# Patient Record
Sex: MALE | Race: WHITE | NOT HISPANIC OR LATINO | URBAN - METROPOLITAN AREA
[De-identification: names, ages, dates, MRNs, and addresses within clinical notes are randomized per-mention and may not be internally consistent; named-entity substitution may affect disease eponyms.]

---

## 2022-02-10 ENCOUNTER — TRANSFERRED RECORDS (OUTPATIENT)
Dept: HEALTH INFORMATION MANAGEMENT | Facility: CLINIC | Age: 37
End: 2022-02-10
Payer: COMMERCIAL

## 2022-03-01 ENCOUNTER — MEDICAL CORRESPONDENCE (OUTPATIENT)
Dept: HEALTH INFORMATION MANAGEMENT | Facility: CLINIC | Age: 37
End: 2022-03-01
Payer: COMMERCIAL

## 2022-03-01 ENCOUNTER — TRANSCRIBE ORDERS (OUTPATIENT)
Dept: OTHER | Age: 37
End: 2022-03-01
Payer: COMMERCIAL

## 2022-03-01 ENCOUNTER — TRANSFERRED RECORDS (OUTPATIENT)
Dept: HEALTH INFORMATION MANAGEMENT | Facility: CLINIC | Age: 37
End: 2022-03-01
Payer: COMMERCIAL

## 2022-03-01 DIAGNOSIS — K11.8 OTHER DISEASES OF SALIVARY GLANDS: Primary | ICD-10-CM

## 2022-03-02 NOTE — TELEPHONE ENCOUNTER
FUTURE VISIT INFORMATION      FUTURE VISIT INFORMATION:    Date: 22    Time: 1PM    [place] [facility] [place]:    Referring provider:  Jonnie Munoz MD    Referring providers clinic:  [facility]    Reason for visit/diagnosis  Other diseases of salivary glands, referred by Jonnie Munoz MD in [facility] EXTERNAL DATA DEPARTMENT, recds in [facility] per pt     RECORDS REQUESTED FROM:         Clinic name Comments Records Status Imaging Status   [facility]  3/1/22 ENT note from Jonnie diehl MD Scanned in [facility]   [address], [place] [address]  [place]   Ph. [phone]  [facility] recs fx. [phone]      2/10/22 CT NECK      *trackin Scanned in EPIC req 3/2/22 - received 3/8/22   [facility] SPEECH THERAPY   2010 note from Zulma Granados MA, CCC/SLP   Care everywhere     [facility] EAR, NOSE AND THROAT  12/21/10 note from César Woods MD   Care everywhere                             3/2/22 3:22M called and spoke with [facility], they cannot push images. Sent a fax for imaging disc to be mailed out - Amay  3/3/22 8:25AM received a call from victoria at [facility] radiology, images were done on 2/10/22 not 3/1/22, notified Victoria that the exam date on the report we have is cut off so it looked like 3/1/22, she will mail out a disc today - Amay  3/7/22 8AM disc received from [facility], sent for upload - Amay

## 2022-03-02 NOTE — TELEPHONE ENCOUNTER
FUTURE VISIT INFORMATION - rescheduled       FUTURE VISIT INFORMATION:    Date: 3/7/22    Time: 1:40PM    [place] [facility] [place]  REFERRAL INFORMATION:    Referring provider:  Jonnie Munoz MD    Referring providers clinic:  [facility]    Reason for visit/diagnosis  Other diseases of salivary glands, referred by Jonnie Munoz MD in [facility] EXTERNAL DATA DEPARTMENT, recds in [facility] per pt    RECORDS REQUESTED FROM:       Clinic name Comments Records Status Imaging Status   [facility]  3/1/22 ENT note from Jonnie diehl MD Scanned in [facility]   [address], [place] [address]  [place]   Ph. [phone]  Med recs fx. [phone]     2/10/22 CT NECK     *trackin Scanned in EPIC re 3/2/22   [facility] SPEECH THERAPY   2010 note from Zulma Granados MA, CCC/SLP   Care everywhere    [facility] EAR, NOSE AND THROAT  12/21/10 note from César Woods MD   Care everywhere                   3/2/22 3:22M called and spoke with [facility], they cannot push images. Sent a fax for imaging disc to be mailed out - Amay  3/3/22 8:25AM received a call from victoria at [facility] radiology, images were done on 2/10/22 not 3/1/22, notified Victoria that the exam date on the report we have is cut off so it looked like 3/1/22, she will mail out a disc today - Amay

## 2022-03-07 ENCOUNTER — PRE VISIT (OUTPATIENT)
Dept: OTOLARYNGOLOGY | Facility: CLINIC | Age: 37
End: 2022-03-07

## 2022-03-23 ENCOUNTER — TELEPHONE (OUTPATIENT)
Dept: OTOLARYNGOLOGY | Facility: CLINIC | Age: 37
End: 2022-03-23
Payer: COMMERCIAL

## 2022-03-23 NOTE — TELEPHONE ENCOUNTER
Pt left message on surgery scheduling line regarding his appointment on 4/19/2022 with Dr. Swartz. Per patient, he was told that he was going to receive a packet and information in the mail and he has not and does not know where to go.    This is a clinic appointment. Routed to the clinic coordinators for follow up.

## 2022-04-19 ENCOUNTER — OFFICE VISIT (OUTPATIENT)
Dept: OTOLARYNGOLOGY | Facility: CLINIC | Age: 37
End: 2022-04-19
Attending: OTOLARYNGOLOGY
Payer: COMMERCIAL

## 2022-04-19 ENCOUNTER — PRE VISIT (OUTPATIENT)
Dept: OTOLARYNGOLOGY | Facility: CLINIC | Age: 37
End: 2022-04-19

## 2022-04-19 ENCOUNTER — PREP FOR PROCEDURE (OUTPATIENT)
Dept: OTOLARYNGOLOGY | Facility: CLINIC | Age: 37
End: 2022-04-19

## 2022-04-19 VITALS
TEMPERATURE: 98.1 F | HEIGHT: 75 IN | BODY MASS INDEX: 31.46 KG/M2 | WEIGHT: 253 LBS | DIASTOLIC BLOOD PRESSURE: 78 MMHG | HEART RATE: 99 BPM | SYSTOLIC BLOOD PRESSURE: 138 MMHG

## 2022-04-19 DIAGNOSIS — K11.8 OTHER DISEASES OF SALIVARY GLANDS: ICD-10-CM

## 2022-04-19 DIAGNOSIS — K11.20 SIALADENITIS: Primary | ICD-10-CM

## 2022-04-19 PROCEDURE — 99203 OFFICE O/P NEW LOW 30 MIN: CPT | Performed by: OTOLARYNGOLOGY

## 2022-04-19 RX ORDER — DEXAMETHASONE SODIUM PHOSPHATE 4 MG/ML
10 INJECTION, SOLUTION INTRA-ARTICULAR; INTRALESIONAL; INTRAMUSCULAR; INTRAVENOUS; SOFT TISSUE ONCE
Status: CANCELLED | OUTPATIENT
Start: 2022-04-19 | End: 2022-04-19

## 2022-04-19 ASSESSMENT — PAIN SCALES - GENERAL: PAINLEVEL: NO PAIN (0)

## 2022-04-19 NOTE — LETTER
2022       RE: Baljit Liao  [address] [address]     Dear Colleague,    Thank you for referring your patient, Baljit Liao, to the [facility] [place] at [facility]. Please see a copy of my visit note below.      Otolaryngology Clinic      Name: Baljit Liao  MRN: [id number]  Age: 36 year old  : 1985  Referring provider: Jonnie Munoz  2022      Chief Complaint:  Consultation    History of Present Illness:   Baljit Liao is a 36 year old male with who presents for consultation regarding pre-auricular swelling and discomfort in his mouth while eating.  He tells me that essentially any food he eats promotes increased salivation from his upper posterior salivary glands. This has been ongoing for the last two years and seemed to be right after dental work. He did have an infection just after the first work was done and was treated with antibiotics. He then again was treated with antibiotics a few months ago after an infection that started after dental work again. Both infections did swell to the point where his jaw was impeded from biting down normally. 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     Active Medications:   No current outpatient medications on file.      Allergies:   Patient has no allergy information on record.      Past Medical History:  No past medical history on file.  There is no problem list on file for this patient.       Past Surgical History:  No past surgical history on file.    Family History:   No family history on file.      Social History:        Review of Systems:   Pertinent items are noted in HPI or as in patient entered ROS below, remainder of complete ROS is negative.   No flowsheet data found.      Physical Exam:   There were no vitals taken for this visit.     Constitutional:  The patient was accompanied,  well-groomed, and in no acute distress.    Skin:  Warm and pink.    Neurologic:  Alert and oriented x 3.  CN's III-XII within normal limits.  Voice normal.   Psychiatric:  The patient's affect was calm, cooperative, and appropriate.    Respiratory:  Breathing comfortably without stridor or exertion of accessory muscles.    Eyes: Extraocular movement intact.    Head:  Normocephalic and atraumatic.  No lesions or scars.    Nose:  Sinuses were non-tender.  Anterior rhinoscopy revealed midline septum and absence of purulence or polyps.    OC/OP:  Normal tongue, floor of mouth, buccal mucosa, and palate.  No lesions or masses on inspection or palpation.  No abnormal lymph tissue in the oropharynx.  The pterygoid region is non-tender.    Neck:  Supple with normal laryngeal and tracheal landmarks.  The parotid beds were without masses.  No palpable thyroid.  Lymphatic:  There is no palpable lymphadenopathy in the neck.     Assessment and Plan:    ICD-10-CM    1. 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 We reviewed CT scan from facility in [place] which does show some enhancement of this right parotid region, however no other ductal obstructions noted. I did discuss sialendoscopy as an option for exploring/washing his salivary duct, however I did explain the limitations to this. He would like to proceed with this.      Scribe Disclosure:  I, Marcello Singh, am serving as a scribe to document services personally performed by Romeo Swartz MD at this visit, based upon the provider's statements to me. All documentation has been reviewed by the aforementioned provider prior to being entered into the official medical record.       Again, thank you for allowing me to participate in the care of your patient.      Sincerely,    Romeo Swartz MD

## 2022-04-19 NOTE — PROGRESS NOTES
Otolaryngology Clinic      Name: Baljit Liao  MRN: [id number]  Age: 36 year old  : 1985  Referring provider: Jonnie Munoz  2022      Chief Complaint:  Consultation    History of Present Illness:   Baljit Liao is a 36 year old male with who presents for consultation regarding pre-auricular swelling and discomfort in his mouth while eating.  He tells me that essentially any food he eats promotes increased salivation from his upper posterior salivary glands. This has been ongoing for the last two years and seemed to be right after dental work. He did have an infection just after the first work was done and was treated with antibiotics. He then again was treated with antibiotics a few months ago after an infection that started after dental work again. Both infections did swell to the point where his jaw was impeded from biting down normally. 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     Active Medications:   No current outpatient medications on file.      Allergies:   Patient has no allergy information on record.      Past Medical History:  No past medical history on file.  There is no problem list on file for this patient.       Past Surgical History:  No past surgical history on file.    Family History:   No family history on file.      Social History:        Review of Systems:   Pertinent items are noted in HPI or as in patient entered ROS below, remainder of complete ROS is negative.   No flowsheet data found.      Physical Exam:   There were no vitals taken for this visit.     Constitutional:  The patient was accompanied, well-groomed, and in no acute distress.    Skin:  Warm and pink.    Neurologic:  Alert and oriented x 3.  CN's III-XII within normal limits.  Voice normal.   Psychiatric:  The patient's affect was calm, cooperative, and appropriate.    Respiratory:  Breathing comfortably without stridor or exertion of accessory muscles.    Eyes:  Extraocular movement intact.    Head:  Normocephalic and atraumatic.  No lesions or scars.    Nose:  Sinuses were non-tender.  Anterior rhinoscopy revealed midline septum and absence of purulence or polyps.    OC/OP:  Normal tongue, floor of mouth, buccal mucosa, and palate.  No lesions or masses on inspection or palpation.  No abnormal lymph tissue in the oropharynx.  The pterygoid region is non-tender.    Neck:  Supple with normal laryngeal and tracheal landmarks.  The parotid beds were without masses.  No palpable thyroid.  Lymphatic:  There is no palpable lymphadenopathy in the neck.     Assessment and Plan:    ICD-10-CM    1. 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 We reviewed CT scan from facility in [place] which does show some enhancement of this right parotid region, however no other ductal obstructions noted. I did discuss sialendoscopy as an option for exploring/washing his salivary duct, however I did explain the limitations to this. He would like to proceed with this.      Scribe Disclosure:  I, Marcello Singh, am serving as a scribe to document services personally performed by Romeo Swartz MD at this visit, based upon the provider's statements to me. All documentation has been reviewed by the aforementioned provider prior to being entered into the official medical record.

## 2022-04-19 NOTE — PATIENT INSTRUCTIONS
"You were seen in the clinic today by Dr. Swartz. If you have any questions or concerns after your appointment, please call the clinic at [phone]. Press \"1\" for scheduling, press \"3\" for nurse advice.    2.   The following has been recommended for you based upon your appointment today:   Proceed with surgery. Someone from our scheduling team will reach out to you to schedule surgery.   -In the meantime start applying warm, moist heat to the area as needed. Add an extra quart of water to your water intake. Try sour candy to help with clearing that duct.    3.   Plan to return the clinic after surgery.       [facility]  Department of Ot[facility]ology  [phone]       Surgery Teaching    1. Someone from our scheduling department will call you within the next few days to get you scheduled with your provider for surgery. If no one has called you in one week, please notify us.    2. You must have a physical exam (called  history and physical ) within 30 days of surgery. You may complete this with your primary care provider.   A. If your provider is outside of the [facility] please have them complete the preoperative forms provided to you in the surgery packet you will be mailed and be sure to have your provider fax them to the appropriate location prior to surgery. For surgery at the [facility] the fax number [phone]. For surgery at the [facility] the fax number is [phone].  B. In some cases we may have you see our Preoperative Assessment Center. If we have expressed this to you, our  will set up your appointment with them when they call to set up your surgery.    3. Complete a COVID test 4 days prior to surgery. You will need to have this done regardless of whether you have had the COVID vaccine. If you have the test performed at a clinic outside of the network, you will need to have the test results faxed to us.    4. For same-day surgery, you must arrange for an adult " to take you home from the Center. An adult must stay with you for the first 24 hours after surgery. You cannot drive for 24 hours.     5. Ask your doctor what medicines are safe before surgery. For over the counter medications and supplements it is advised that you do NOT TAKE MOTRIN, IBUPROFEN, ASPIRIN, ALEVE, GARLIC SUPPLEMENTS or FISH OIL x 7 days prior to surgery (to prevent excess bleeding and bruising at time of surgery). If your provider advises you to take any medication the morning of surgery you should take this with a sip of water.    6. A few days prior to surgery a nurse will call you to review your health history and instructions for before and after surgery. They will give you your final arrival time based upon your scheduled arrival time for surgery.    7. Call the surgical team if there's any change in your health prior to surgery. Things you should call for include but are not limited to signs of a cold or the flu (sore throat, runny nose, cough, rash, fever). Other things to notify them for is for any open wounds (cuts, scrapes, scratches) near to the surgery site.    8. If you drink alcohol, stop drinking alcohol at least 24 hours before surgery.    9. If you smoke, stop or at least cut down on smoking 24 hours before surgery.    10.Take a bath or shower the night before and the morning of surgery (as told by your surgeon). Use an antiseptic soap. If your doctor does not give you special soap, buy Hibiclens or Kourtney-Stat at the drug store or ask the pharmacist to suggest a brand. You will wash with this from the neck down, washing your hair and face as you would normally.   A. When you are done with your shower please be sure to use clean towels to dry with, have clean linens on your bed, and put on clean clothes each time.   B. DO NOT put on lotion, powder, perfume, deodorant or make-up after bathing.    11. You can eat a normal meal the night before surgery. Do not eat any solid foods or drink  any milk products for 8 hours before surgery.     12. You may drink clear liquids until 2 hours before surgery. Clear liquids include water, Gatorade, apple juice and liquids you can see through.    13. No eating or drinking 2 hours prior to surgery until after surgery. Your post op team will review any diet limitations you might have and when you can start eating and drinking again after surgery.      If you have any questions before or after surgery please call:    CAPRICE Brooks  [facility]  Department of Otolaryngology  [phone]

## 2022-04-19 NOTE — NURSING NOTE
"Chief Complaint   Patient presents with     Consult     Other diseases of salivary glands       Blood pressure 138/78, pulse 99, temperature 98.1  F (36.7  C), temperature source Temporal, height 1.905 m (6' 3\"), weight 114.8 kg (253 lb).    Celi Puckett, EMT    "

## 2022-04-22 ENCOUNTER — TELEPHONE (OUTPATIENT)
Dept: OTOLARYNGOLOGY | Facility: CLINIC | Age: 37
End: 2022-04-22
Payer: COMMERCIAL

## 2022-04-22 DIAGNOSIS — Z11.59 ENCOUNTER FOR SCREENING FOR OTHER VIRAL DISEASES: Primary | ICD-10-CM

## 2022-04-22 PROBLEM — K11.20 SIALADENITIS: Status: ACTIVE | Noted: 2022-04-22

## 2022-04-22 NOTE — TELEPHONE ENCOUNTER
Called patient to schedule surgery with Dr. Swartz    Date of Surgery: 5/16    Location of surgery: [facility]    Pre-Op H&P: PCP    Imaging Scheduled:  No    Scheduled COVID-19 Testing: Yes    Post-Op Appt Date: TBD    Surgery Packet Mailed: no    Additional comments: Patient aware of above dates. Will schedule pre op and covid in the requested time span. Patient will review packet once mailed and call with any questions          Anna C. Schoenecker on 4/22/2022 at 1:46 PM

## 2022-05-02 ENCOUNTER — TELEPHONE (OUTPATIENT)
Dept: OTOLARYNGOLOGY | Facility: CLINIC | Age: 37
End: 2022-05-02
Payer: COMMERCIAL

## 2022-05-02 NOTE — TELEPHONE ENCOUNTER
Patient called regarding a covid-19 test. Patient states local clinic can do a MASON in-house test. Informed patient that he can do a RT-PCR test or NAAT. Not a rapid antigen test, or a home test. Patient will call his clinic to verify.     Carlota Antonio on 5/2/2022 at 11:11 AM

## 2022-05-05 ENCOUNTER — TELEPHONE (OUTPATIENT)
Dept: OTOLARYNGOLOGY | Facility: CLINIC | Age: 37
End: 2022-05-05
Payer: COMMERCIAL

## 2022-05-05 NOTE — TELEPHONE ENCOUNTER
[facility] Call Center    Phone Message    May a detailed message be left on voicemail: yes     Reason for Call: Other: Pt is having a hard time scheduling a pre- op covid test at his local clinic. He has been rerouted a few times and sent info on scheduling a covid test here which would be a 6 hour round trip for him. Pt would like a call back to discuss how far out he can have the covid test done since his surgery is scheduled Monday 6/7 with Dr. Swartz. He is worried that if he gets the covid test done the 4 days out, that prior Thursday or Friday, he won't have the results back in time for his surgery. Please call pt back to discuss his options for this     Action Taken: Message routed to:  [facility] ENT    Travel Screening: Not Applicable

## 2022-05-05 NOTE — TELEPHONE ENCOUNTER
Called patient and informed him that he can have his covid-19 test done on Thursday or Friday. Inquired with patient if he has a covid-19 test scheduled. Pt states that he does not have a test scheduled yet and does not have a place close to him. Informed patient that he could likely get a covid-19 test at [facility] in [place]. Pt states he did not call there. Pt challenged writing asking if writer is sure that he can get one done there. Informed him we can fax orders and a lot of our patients have gotten covid-19 tests at  in [place]. Pt would like this set up on anytime on Thursday or Friday before 230pm. Informed patient writer will have someone arrange and then we will reach back out to him with his covid-19 appt and time. Patient was okay with this. States he just doesn't want his surgery cancelled.     Carlota Antonio on 5/5/2022 at 4:55 PM

## 2022-05-05 NOTE — TELEPHONE ENCOUNTER
Called [facility] regarding getting patient scheduled for Covid test as patient states he cannot find a facility to do this.     Spoke with someone at their main call center who referred me to scheduling. The individual this writer spoke with in scheduling advised that there is a [place] clinic that the patient could schedule at. Informed them that patient would have to have the test done on May 12th. This writer was then informed that the [place] clinic isn't opening until May 16th which is too late for the Covid testing. This writer was relayed to yet another extension in an attempt to get patient scheduled, an after hours call center.     This writer was then provided with a phone number and fax number after determining the patient could go to [facility] to have the test completed. This writer was once again transferred to that clinic as it was stated that there would be someone there who could assist as they have an urgent care on site.    This writer got through to the clinic who advised this writer that they could not schedule for the clinic and that this writer would need to call back tomorrow.     Order for pre op covid test sent to fax [phone]. Phone number to the [facility] is [phone].     This writer will attempt to call the clinic tomorrow to get patient scheduled for his covid test. When faxing paperwork this writer asked for them to call patient to schedule and fax results to [phone].    Patient's surgery is scheduled for May 16th and Dr. Swartz's team will continue to work to ensure patient gets pre op covid completed in [place] prior to his surgery on May 16th.    This writer will follow up on Monday if unable to communicate with the clinic on Friday.    OZZIE PHILLIP LPN on 5/5/2022 at 5:49 PM

## 2022-05-06 NOTE — TELEPHONE ENCOUNTER
This writer updated CAPRICE Chappell regarding patient's covid testing. CAPRICE Chappell to contact facility and ensure patient gets scheduled.    Nothing further from this writer at this time.    OZZIE PHILLIP LPN on 5/6/2022 at 11:37 AM

## 2022-05-13 ENCOUNTER — TELEPHONE (OUTPATIENT)
Dept: OTOLARYNGOLOGY | Facility: CLINIC | Age: 37
End: 2022-05-13
Payer: COMMERCIAL

## 2022-05-13 NOTE — TELEPHONE ENCOUNTER
----- Message from Carlota Antonio sent at 5/13/2022  8:20 AM CDT -----  Regarding: FW: Covid positive  Oh my .... after all that....   Can you give him a call and let him know policy is to reschedule?   Unsure of symptoms.     ----- Message -----  From: Marion Blank  Sent: 5/13/2022   7:46 AM CDT  To: Carlota Antonio  Subject: Covid positive                                   Carlota,  Since the attached patient is positive, I am going to move him to the depot and move the last patient up.  It looks like he was contacted by the covid RN team to tell him he was positive so he will just need a call for reschedule.  Let me know if you have any concerns with this planMarion

## 2022-05-13 NOTE — TELEPHONE ENCOUNTER
Writer called and spoke with patient, he is aware of the test results and is asking to be rescheduled for 2 Mondays from now. Patient denies any covid symptoms.     I informed patient Carlota, surgery scheduler, is working on rescheduling date and we will call patient with conformation.     Misti Seymour RN on 5/13/2022 at 12:23 PM

## 2022-05-19 NOTE — TELEPHONE ENCOUNTER
"Patient called asking about surgery and post op date. I explained to the patient his surgery was rescheduled to 06/06/22 and he has a 3 week follow-up after that.     Patient states he prefers to have the follow-up over the phone after the surgery because he does not want to drive his \"unsafe car\" 6 hours for a follow-up, patient also mentioned that financially this does not work for him.     I will discuss this with Dr. Swartz. I also explained to patient he does not need to repeat his covid test or his pre op. Dr. Swartz has agreed to addend/adjust his pre op notes the morning of. Patient verbalized understanding.     Misti Seymour RN on 5/19/2022 at 10:08 AM   "

## 2022-06-03 ENCOUNTER — ANESTHESIA EVENT (OUTPATIENT)
Dept: SURGERY | Facility: AMBULATORY SURGERY CENTER | Age: 37
End: 2022-06-03
Payer: COMMERCIAL

## 2022-06-06 ENCOUNTER — ANESTHESIA (OUTPATIENT)
Dept: SURGERY | Facility: AMBULATORY SURGERY CENTER | Age: 37
End: 2022-06-06
Payer: COMMERCIAL

## 2022-06-06 ENCOUNTER — HOSPITAL ENCOUNTER (OUTPATIENT)
Facility: AMBULATORY SURGERY CENTER | Age: 37
Discharge: HOME | End: 2022-06-06
Attending: OTOLARYNGOLOGY
Payer: COMMERCIAL

## 2022-06-06 VITALS
HEIGHT: 75 IN | RESPIRATION RATE: 16 BRPM | WEIGHT: 250 LBS | SYSTOLIC BLOOD PRESSURE: 143 MMHG | DIASTOLIC BLOOD PRESSURE: 101 MMHG | BODY MASS INDEX: 31.08 KG/M2 | OXYGEN SATURATION: 98 % | TEMPERATURE: 98.2 F | HEART RATE: 94 BPM

## 2022-06-06 DIAGNOSIS — K11.20 SIALADENITIS: Primary | ICD-10-CM

## 2022-06-06 PROCEDURE — 42699 UNLISTED PX SALIVRY GLND/DUX: CPT | Performed by: OTOLARYNGOLOGY

## 2022-06-06 PROCEDURE — 42699 UNLISTED PX SALIVRY GLND/DUX: CPT

## 2022-06-06 RX ORDER — SODIUM CHLORIDE, SODIUM LACTATE, POTASSIUM CHLORIDE, CALCIUM CHLORIDE 600; 310; 30; 20 MG/100ML; MG/100ML; MG/100ML; MG/100ML
INJECTION, SOLUTION INTRAVENOUS CONTINUOUS
Status: DISCONTINUED | OUTPATIENT
Start: 2022-06-06 | End: 2022-06-08 | Stop reason: HOSPADM

## 2022-06-06 RX ORDER — OXYCODONE HYDROCHLORIDE 5 MG/1
5 TABLET ORAL EVERY 4 HOURS PRN
Status: DISCONTINUED | OUTPATIENT
Start: 2022-06-06 | End: 2022-06-08 | Stop reason: HOSPADM

## 2022-06-06 RX ORDER — DEXMEDETOMIDINE HYDROCHLORIDE 4 UG/ML
INJECTION, SOLUTION INTRAVENOUS PRN
Status: DISCONTINUED | OUTPATIENT
Start: 2022-06-06 | End: 2022-06-06

## 2022-06-06 RX ORDER — ONDANSETRON 4 MG/1
4 TABLET, ORALLY DISINTEGRATING ORAL EVERY 30 MIN PRN
Status: DISCONTINUED | OUTPATIENT
Start: 2022-06-06 | End: 2022-06-08 | Stop reason: HOSPADM

## 2022-06-06 RX ORDER — CHLORHEXIDINE GLUCONATE ORAL RINSE 1.2 MG/ML
15 SOLUTION DENTAL 2 TIMES DAILY
Qty: 118 ML | Refills: 0 | Status: SHIPPED | OUTPATIENT
Start: 2022-06-06

## 2022-06-06 RX ORDER — PROPOFOL 10 MG/ML
INJECTION, EMULSION INTRAVENOUS PRN
Status: DISCONTINUED | OUTPATIENT
Start: 2022-06-06 | End: 2022-06-06

## 2022-06-06 RX ORDER — OXYCODONE HYDROCHLORIDE 5 MG/1
5 TABLET ORAL EVERY 6 HOURS PRN
Qty: 6 TABLET | Refills: 0 | Status: SHIPPED | OUTPATIENT
Start: 2022-06-06 | End: 2022-06-09

## 2022-06-06 RX ORDER — AZITHROMYCIN 250 MG/1
TABLET, FILM COATED ORAL
Qty: 6 TABLET | Refills: 0 | Status: SHIPPED | OUTPATIENT
Start: 2022-06-06 | End: 2022-06-11

## 2022-06-06 RX ORDER — LIDOCAINE HYDROCHLORIDE AND EPINEPHRINE 10; 10 MG/ML; UG/ML
INJECTION, SOLUTION INFILTRATION; PERINEURAL PRN
Status: DISCONTINUED | OUTPATIENT
Start: 2022-06-06 | End: 2022-06-06 | Stop reason: HOSPADM

## 2022-06-06 RX ORDER — MEPERIDINE HYDROCHLORIDE 25 MG/ML
12.5 INJECTION INTRAMUSCULAR; INTRAVENOUS; SUBCUTANEOUS
Status: DISCONTINUED | OUTPATIENT
Start: 2022-06-06 | End: 2022-06-08 | Stop reason: HOSPADM

## 2022-06-06 RX ORDER — LIDOCAINE 40 MG/G
CREAM TOPICAL
Status: DISCONTINUED | OUTPATIENT
Start: 2022-06-06 | End: 2022-06-06 | Stop reason: HOSPADM

## 2022-06-06 RX ORDER — HYDROMORPHONE HYDROCHLORIDE 1 MG/ML
0.2 INJECTION, SOLUTION INTRAMUSCULAR; INTRAVENOUS; SUBCUTANEOUS EVERY 5 MIN PRN
Status: DISCONTINUED | OUTPATIENT
Start: 2022-06-06 | End: 2022-06-06 | Stop reason: HOSPADM

## 2022-06-06 RX ORDER — FENTANYL CITRATE 50 UG/ML
25 INJECTION, SOLUTION INTRAMUSCULAR; INTRAVENOUS
Status: DISCONTINUED | OUTPATIENT
Start: 2022-06-06 | End: 2022-06-08 | Stop reason: HOSPADM

## 2022-06-06 RX ORDER — PROPOFOL 10 MG/ML
INJECTION, EMULSION INTRAVENOUS CONTINUOUS PRN
Status: DISCONTINUED | OUTPATIENT
Start: 2022-06-06 | End: 2022-06-06

## 2022-06-06 RX ORDER — DEXAMETHASONE SODIUM PHOSPHATE 10 MG/ML
10 INJECTION, SOLUTION INTRAMUSCULAR; INTRAVENOUS ONCE
Status: COMPLETED | OUTPATIENT
Start: 2022-06-06 | End: 2022-06-06

## 2022-06-06 RX ORDER — LIDOCAINE HYDROCHLORIDE 20 MG/ML
INJECTION, SOLUTION INFILTRATION; PERINEURAL PRN
Status: DISCONTINUED | OUTPATIENT
Start: 2022-06-06 | End: 2022-06-06

## 2022-06-06 RX ORDER — FENTANYL CITRATE 50 UG/ML
INJECTION, SOLUTION INTRAMUSCULAR; INTRAVENOUS PRN
Status: DISCONTINUED | OUTPATIENT
Start: 2022-06-06 | End: 2022-06-06

## 2022-06-06 RX ORDER — FENTANYL CITRATE 50 UG/ML
25 INJECTION, SOLUTION INTRAMUSCULAR; INTRAVENOUS EVERY 5 MIN PRN
Status: DISCONTINUED | OUTPATIENT
Start: 2022-06-06 | End: 2022-06-06 | Stop reason: HOSPADM

## 2022-06-06 RX ORDER — SODIUM CHLORIDE, SODIUM LACTATE, POTASSIUM CHLORIDE, CALCIUM CHLORIDE 600; 310; 30; 20 MG/100ML; MG/100ML; MG/100ML; MG/100ML
INJECTION, SOLUTION INTRAVENOUS CONTINUOUS
Status: DISCONTINUED | OUTPATIENT
Start: 2022-06-06 | End: 2022-06-06 | Stop reason: HOSPADM

## 2022-06-06 RX ORDER — ONDANSETRON 2 MG/ML
INJECTION INTRAMUSCULAR; INTRAVENOUS PRN
Status: DISCONTINUED | OUTPATIENT
Start: 2022-06-06 | End: 2022-06-06

## 2022-06-06 RX ORDER — ONDANSETRON 2 MG/ML
4 INJECTION INTRAMUSCULAR; INTRAVENOUS EVERY 30 MIN PRN
Status: DISCONTINUED | OUTPATIENT
Start: 2022-06-06 | End: 2022-06-08 | Stop reason: HOSPADM

## 2022-06-06 RX ADMIN — Medication 50 MG: at 11:02

## 2022-06-06 RX ADMIN — PROPOFOL 300 MG: 10 INJECTION, EMULSION INTRAVENOUS at 11:02

## 2022-06-06 RX ADMIN — FENTANYL CITRATE 50 MCG: 50 INJECTION, SOLUTION INTRAMUSCULAR; INTRAVENOUS at 11:06

## 2022-06-06 RX ADMIN — FENTANYL CITRATE 50 MCG: 50 INJECTION, SOLUTION INTRAMUSCULAR; INTRAVENOUS at 11:00

## 2022-06-06 RX ADMIN — DEXMEDETOMIDINE HYDROCHLORIDE 4 MCG: 4 INJECTION, SOLUTION INTRAVENOUS at 11:26

## 2022-06-06 RX ADMIN — PROPOFOL 200 MCG/KG/MIN: 10 INJECTION, EMULSION INTRAVENOUS at 11:02

## 2022-06-06 RX ADMIN — DEXAMETHASONE SODIUM PHOSPHATE 10 MG: 10 INJECTION, SOLUTION INTRAMUSCULAR; INTRAVENOUS at 11:02

## 2022-06-06 RX ADMIN — Medication 0.5 MG: at 11:24

## 2022-06-06 RX ADMIN — DEXMEDETOMIDINE HYDROCHLORIDE 8 MCG: 4 INJECTION, SOLUTION INTRAVENOUS at 11:15

## 2022-06-06 RX ADMIN — ONDANSETRON 4 MG: 2 INJECTION INTRAMUSCULAR; INTRAVENOUS at 12:03

## 2022-06-06 RX ADMIN — SODIUM CHLORIDE, SODIUM LACTATE, POTASSIUM CHLORIDE, CALCIUM CHLORIDE: 600; 310; 30; 20 INJECTION, SOLUTION INTRAVENOUS at 10:12

## 2022-06-06 RX ADMIN — LIDOCAINE HYDROCHLORIDE 100 MG: 20 INJECTION, SOLUTION INFILTRATION; PERINEURAL at 11:02

## 2022-06-06 NOTE — OR NURSING
Patient refused wheelchair on discharge.  This RN walked out of unit with patient.  Patient stable on feet.  Significant other comfortable walking to car with patient.

## 2022-06-06 NOTE — DISCHARGE INSTRUCTIONS
[facility]  Home Care Following Anesthesia  For 24 hours after surgery:  Get plenty of rest.  A responsible adult must stay with you for at least 24 hours after you leave the surgery center.  Do not drive or use heavy equipment.  If you have weakness or tingling, don't drive or use heavy equipment until this feeling goes away.   Do not drink alcohol.   Avoid strenuous or risky activities.  Ask for help when climbing stairs.  You may feel lightheaded.  IF so, sit for a few minutes before standing.  Have someone help you get up.   If you have nausea (feel sick to your stomach): Drink only clear liquids such as apple juice, ginger ale, broth or 7-Up.  Rest may also help.  Be sure to drink enough fluids.  Move to a regular diet as you feel able.   You may have a slight fever.  Call the doctor if your fever is over 100 F (37.7 C) (taken under the tongue) or lasts longer than 24 hours.  You may have a dry mouth, a sore throat, muscle aches or trouble sleeping. These should go away after 24 hours.  Do not make important or legal decisions.   It is recommended to avoid smoking.               Tips for taking pain medications  To get the best pain relief possible, remember these points:  Take pain medications as directed, before pain becomes severe.  Pain medication can upset your stomach: taking it with food may help.  Constipation is a common side effect of pain medication. Drink plenty of  fluids.  Eat foods high in fiber. Take a stool softener if recommended by your doctor or pharmacist.  Do not drink alcohol, drive or operate machinery while taking pain medications.  Ask about other ways to control pain, such as with heat, ice or relaxation.    Tylenol/Acetaminophen Consumption  To help encourage the safe use of acetaminophen, the makers of TYLENOL  have lowered the maximum daily dose for single-ingredient Extra Strength TYLENOL  (acetaminophen) products sold in the [place] from 8 pills  per day (4,000 mg) to 6 pills per day (3,000 mg). The dosing interval has also changed from 2 pills every 4-6 hours to 2 pills every 6 hours.  If you feel your pain relief is insufficient, you may take Tylenol/Acetaminophen in addition to your narcotic pain medication.   Be careful not to exceed 3,000 mg of Tylenol/Acetaminophen in a 24 hour period from all sources.  If you are taking extra strength Tylenol/acetaminophen (500 mg), the maximum dose is 6 tablets in 24 hours.  If you are taking regular strength acetaminophen (325 mg), the maximum dose is 9 tablets in 24 hours.    Call a doctor for any of the following:  Signs of infection (fever, growing tenderness at the surgery site, a large amount of drainage or bleeding, severe pain, foul-smelling drainage, redness, swelling).  It has been over 8 to 10 hours since surgery and you are still not able to urinate (pass water).  Headache for over 24 hours.  Numbness, tingling or weakness the day after surgery (if you had spinal anesthesia).  Signs of Covid-19 infection (temperature over 100 degrees, shortness of breath, cough, loss of taste/smell, generalized body aches, persistent headache, chills, sore throat, nausea/vomiting/diarrhea)    Your doctor is:       Dr. Romeo Swartz, ENT Otolaryngology: [phone]               After hours and weekends dial [phone] and ask for the resident on call for:  ENT Otolaryngology  For emergency care, call the:  [facility]:  [phone] (TTY for hearing impaired: [phone])

## 2022-06-06 NOTE — ANESTHESIA PREPROCEDURE EVALUATION
Anesthesia Pre-Procedure Evaluation    Patient: Baljit Liao   MRN: [id number] : 1985        Procedure : Procedure(s):  RIGHT REMOVAL, CALCULUS, SALIVARY GLAND, ENDOSCOPIC          History reviewed. No pertinent past medical history.   History reviewed. No pertinent surgical history.   Allergies   Allergen Reactions     Penicillins Hives and Swelling      Social History     Tobacco Use     Smoking status: Not on file     Smokeless tobacco: Not on file   Substance Use Topics     Alcohol use: Not on file      Wt Readings from Last 1 Encounters:   22 113.4 kg (250 lb)        Anesthesia Evaluation            ROS/MED HX  ENT/Pulmonary:  - neg pulmonary ROS     Neurologic:  - neg neurologic ROS     Cardiovascular:  - neg cardiovascular ROS     METS/Exercise Tolerance:     Hematologic:  - neg hematologic  ROS     Musculoskeletal: Comment: c5-6 fusion - neg musculoskeletal ROS     GI/Hepatic:  - neg GI/hepatic ROS     Renal/Genitourinary:  - neg Renal ROS     Endo:  - neg endo ROS     Psychiatric/Substance Use:  - neg psychiatric ROS     Infectious Disease: Comment: Recent COVID positive.  Asymptomatic - neg infectious disease ROS     Malignancy:  - neg malignancy ROS     Other:  - neg other ROS          Physical Exam    Airway  airway exam normal      Mallampati: II   TM distance: > 3 FB   Neck ROM: full   Mouth opening: > 3 cm    Respiratory Devices and Support         Dental  no notable dental history         Cardiovascular          Rhythm and rate: regular and normal     Pulmonary   pulmonary exam normal        breath sounds clear to auscultation           OUTSIDE LABS:  CBC: No results found for: WBC, HGB, HCT, PLT  BMP: No results found for: NA, POTASSIUM, CHLORIDE, CO2, BUN, CR, GLC  COAGS: No results found for: PTT, INR, FIBR  POC: No results found for: BGM, HCG, HCGS  HEPATIC: No results found for: ALBUMIN, PROTTOTAL, ALT, AST, GGT, ALKPHOS, BILITOTAL, BILIDIRECT, ALAINA  OTHER: No results found  for: PH, LACT, A1C, AILEEN, PHOS, MAG, LIPASE, AMYLASE, TSH, T4, T3, CRP, SED    Anesthesia Plan    ASA Status:  1   NPO Status:  NPO Appropriate    Anesthesia Type: General.     - Airway: ETT   Induction: Intravenous.   Maintenance: Balanced.        Consents    Anesthesia Plan(s) and associated risks, benefits, and realistic alternatives discussed. Questions answered and patient/representative(s) expressed understanding.    - Discussed:     - Discussed with:  Patient      - Extended Intubation/Ventilatory Support Discussed: No.      - Patient is DNR/DNI Status: No    Use of blood products discussed: No .     Postoperative Care    Pain management: IV analgesics, Oral pain medications, Multi-modal analgesia.   PONV prophylaxis: Ondansetron (or other 5HT-3), Background Propofol Infusion     Comments:                Jaswant Lr MD

## 2022-06-06 NOTE — ANESTHESIA CARE TRANSFER NOTE
Patient: Baljit Liao    Procedure: Procedure(s):  RIGHT PAROTID SIALENDOSCOPY       Diagnosis: Sialadenitis [K11.20]  Diagnosis Additional Information: No value filed.    Anesthesia Type:   General     Note:    Oropharynx: oropharynx clear of all foreign objects and spontaneously breathing  Level of Consciousness: drowsy  Oxygen Supplementation: face mask  Level of Supplemental Oxygen (L/min / FiO2): 6  Independent Airway: airway patency satisfactory and stable  Dentition: dentition unchanged  Vital Signs Stable: post-procedure vital signs reviewed and stable  Report to RN Given: handoff report given  Patient transferred to: PACU    Handoff Report: Identifed the Patient, Identified the Reponsible Provider, Reviewed the pertinent medical history, Discussed the surgical course, Reviewed Intra-OP anesthesia mangement and issues during anesthesia, Set expectations for post-procedure period and Allowed opportunity for questions and acknowledgement of understanding      Vitals:  Vitals Value Taken Time   /87    Temp 36.6    Pulse 96 06/06/22 1222   Resp 12 06/06/22 1222   SpO2 100 % 06/06/22 1222   Vitals shown include unvalidated device data.    Electronically Signed By: SUSIE Wagner CRNA  June 6, 2022  12:24 PM

## 2022-06-06 NOTE — BRIEF OP NOTE
[facility]    Brief Operative Note    Pre-operative diagnosis: Sialadenitis [K11.20]  Post-operative diagnosis Same as pre-operative diagnosis    Procedure: Procedure(s):  RIGHT REMOVAL, CALCULUS, SALIVARY GLAND, ENDOSCOPIC  Surgeon: Surgeon(s) and Role:     * Romeo Swartz MD - Primary     * Tyler Diop MD - Resident - Assisting  Anesthesia: General   Estimated Blood Loss: Less than 10 ml    Drains: None  Specimens: * No specimens in log *  Findings:   Stenosis right parotid duct.  Complications: None.  Implants: * No implants in log *

## 2022-06-06 NOTE — ANESTHESIA POSTPROCEDURE EVALUATION
Patient: Baljit Liao    Procedure: Procedure(s):  RIGHT PAROTID SIALENDOSCOPY       Anesthesia Type:  General    Note:  Disposition: Outpatient   Postop Pain Control: Uneventful            Sign Out: Well controlled pain   PONV: No   Neuro/Psych: Uneventful            Sign Out: Acceptable/Baseline neuro status   Airway/Respiratory: Uneventful            Sign Out: Acceptable/Baseline resp. status   CV/Hemodynamics: Uneventful            Sign Out: Acceptable CV status   Other NRE: NONE   DID A NON-ROUTINE EVENT OCCUR? No           Last vitals:  Vitals Value Taken Time   /113 06/06/22 1230   Temp     Pulse 101 06/06/22 1238   Resp 14 06/06/22 1238   SpO2 98 % 06/06/22 1238   Vitals shown include unvalidated device data.    Electronically Signed By: Jaswant Lr MD  June 6, 2022  2:45 PM

## 2022-06-06 NOTE — OP NOTE
Procedure Date: 06/06/2022    PREOPERATIVE DIAGNOSIS:  Right parotid sialadenitis.    POSTOPERATIVE DIAGNOSIS:  Right parotid sialadenitis.     PROCEDURE:  Right sialoendoscopy parotid.    SURGEON:  Romeo Swartz MD    BLOOD LOSS:  Minimal.    COMPLICATIONS:  None.    ANESTHESIA:  General.    OPERATIVE FINDINGS:  A distal stricture of Stensen's duct for which we performed a ductoplasty and capacity to wash 10 mL of saline through and through the parotid duct system.    INDICATIONS FOR PROCEDURE:  This is a gentleman has had multiple intermittent swelling of the right parotid gland.  He has got a couple of infections with this as well.  He came to see me on referral for potential sialoendoscopy is here for this today.    DESCRIPTION OF PROCEDURE:  After informed consent was obtained from the patient, he was brought to the operating room.  General endotracheal anesthesia was established.  The right cheek was examined.  We saw the openings for Stensen's duct on the right side, there is a small amount of saliva that we were able to take from this.  I went ahead and we used a series of dilators, both the tapered dilators as well as the straight dilators.  We went from a size 4-0 to a size #4 and 5 and we ahead and we went into the distal duct on the right side.  It turns out that there was some stricturing of this area, so we could use an #11 blade to open up the area for about 1.5 cm, so we actually performed a bit of a ductoplasty on that side as well.  Once this was performed, we were able to go ahead.  We were able to get the scope, we were able to advance the 1.1 mm scope about 3 cm.  Once in the gland, we then went ahead and washed out 10 mL of saline into this area.  We did not have clear visualization of the central portion of the duct itself, but we were indeed able to get a fair amount of solution to wash through the Stensen's duct and the parotid duct system on the right side.  We went ahead and at the end of  the case, we actually used a tapered dilator and then again put the gland with the but the 1.1 mm scope into this area up to a between 1 and 3 cm area in depth and were able to complete washing with a couple mL of saline.  There was good return of saline with the washing of the area. At the conclusion of the case, we saw there was no additional bleeding.  We left the area of the duct that we had opened the mouth just simply opened.  We did not have any sewing or anything else of this nature, probably better salivary flow.  The patient was then awoken from general anesthesia.    Romeo Swartz MD        D: 2022   T: 2022   MT: [id number]    Name:     HETAL ROSARIO  MRN:      [id number]        Account:        [id number]   :      1985           Procedure Date: 2022     Document: [id number]

## 2022-06-06 NOTE — OR NURSING
Pt needs H&P. One from 5/5/2022 is missing a review of systems and is outdated for surgery today. Dr Swartz paged to notify.

## 2022-06-07 ENCOUNTER — PATIENT OUTREACH (OUTPATIENT)
Dept: OTOLARYNGOLOGY | Facility: CLINIC | Age: 37
End: 2022-06-07
Payer: COMMERCIAL

## 2022-06-07 NOTE — OR NURSING
"Patient awoke after surgery and was rude and argumentative in PACU phase 1.  Refused to follow instructions, refused to lay back on the stretcher, pulled EKG leads, pulse ox, and blood pressure cuff off.  When asked to let me please put it back on to monitor his BP he stated 'try and take it from me\" and refused to hand the cuff back.  Pt continued to argue with this RN about the outcome of his surgery, after he was told repeatedly what the doctor found and did during surgery.  He was speaking incoherently about fetal tissue in vaccines, and how COVID is a hoax.  He refused to keep his mask on when asked several times to keep it on.  When trying to talk to the patient he exclaimed \"don't fucking touch me anymore.\"  Management was called to the bedside.    "

## 2022-06-07 NOTE — PROGRESS NOTES
Responsible Attending Physician: Romeo Swartz   Date of Discharge: 06/06/22    Discharge to:  Home     Current Status:  Pt is a 38 y/o Male s/p RIGHT PAROTID SIALENDOSCOPY on 06/06/22.  Reports pre-op symptoms improved. Operative pain is decreasing.  Ambulating without assistance.  Pain well controlled with current meds, ample supply. Denies redness, swelling, increased tenderness, or elevated temp.  Denies current bowel or bladder issues.  No visible sutures/staples in place.        Discharge instructions and medication use were reviewed.  RN triage/on call number given:  [phone] or after hours and w/e - [phone].  Patient verbalized understanding and agreement with current plan.     Follow up appointments:  06/28/22 at 0955    Misti Seymour RN on 6/7/2022

## (undated) DEVICE — SYR 50ML LL W/O NDL 309653

## (undated) DEVICE — SOL NACL 0.9% IRRIG 500ML BOTTLE 2F7123

## (undated) DEVICE — JELLY LUBRICATING SURGILUBE 2OZ TUBE

## (undated) DEVICE — GLOVE PROTEXIS POWDER FREE SMT 8.0  2D72PT80X

## (undated) DEVICE — LINEN TOWEL PACK X5 5464

## (undated) DEVICE — TOOTHBRUSH ADULT NON STERILE MDS136850

## (undated) DEVICE — RAD KNIFE HANDLE W/11 BLADE DISPOSABLE 371611

## (undated) DEVICE — SYR 10ML FINGER CONTROL W/O NDL 309695

## (undated) DEVICE — PACK ENT ENDOSCOPY CUSTOM ASC

## (undated) DEVICE — SUCTION MANIFOLD NEPTUNE 2 SYS 1 PORT 702-025-000

## (undated) DEVICE — NDL 25GA 2"  8881200441

## (undated) DEVICE — PUMP SYSTEM SINGLE ACTION M0067201000

## (undated) RX ORDER — PROPOFOL 10 MG/ML
INJECTION, EMULSION INTRAVENOUS
Status: DISPENSED
Start: 2022-06-06

## (undated) RX ORDER — HYDROMORPHONE HYDROCHLORIDE 1 MG/ML
INJECTION, SOLUTION INTRAMUSCULAR; INTRAVENOUS; SUBCUTANEOUS
Status: DISPENSED
Start: 2022-06-06

## (undated) RX ORDER — DEXAMETHASONE SODIUM PHOSPHATE 10 MG/ML
INJECTION, SOLUTION INTRAMUSCULAR; INTRAVENOUS
Status: DISPENSED
Start: 2022-06-06

## (undated) RX ORDER — FENTANYL CITRATE 50 UG/ML
INJECTION, SOLUTION INTRAMUSCULAR; INTRAVENOUS
Status: DISPENSED
Start: 2022-06-06

## (undated) RX ORDER — LIDOCAINE HCL/PF 100 MG/5ML
SYRINGE (ML) INJECTION
Status: DISPENSED
Start: 2022-06-06

## (undated) RX ORDER — ONDANSETRON 2 MG/ML
INJECTION INTRAMUSCULAR; INTRAVENOUS
Status: DISPENSED
Start: 2022-06-06

## (undated) RX ORDER — DEXMEDETOMIDINE HYDROCHLORIDE 4 UG/ML
INJECTION, SOLUTION INTRAVENOUS
Status: DISPENSED
Start: 2022-06-06